# Patient Record
Sex: FEMALE
[De-identification: names, ages, dates, MRNs, and addresses within clinical notes are randomized per-mention and may not be internally consistent; named-entity substitution may affect disease eponyms.]

---

## 2020-12-16 ENCOUNTER — HOSPITAL ENCOUNTER (EMERGENCY)
Dept: HOSPITAL 53 - M ED | Age: 20
Discharge: HOME | End: 2020-12-16
Payer: COMMERCIAL

## 2020-12-16 VITALS — WEIGHT: 104.21 LBS | HEIGHT: 61 IN | BODY MASS INDEX: 19.68 KG/M2

## 2020-12-16 VITALS — SYSTOLIC BLOOD PRESSURE: 110 MMHG | DIASTOLIC BLOOD PRESSURE: 70 MMHG

## 2020-12-16 DIAGNOSIS — M79.671: ICD-10-CM

## 2020-12-16 DIAGNOSIS — Z04.1: Primary | ICD-10-CM

## 2020-12-16 LAB — B-HCG SERPL QL: NEGATIVE
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
